# Patient Record
Sex: FEMALE | Race: WHITE | Employment: FULL TIME | URBAN - METROPOLITAN AREA
[De-identification: names, ages, dates, MRNs, and addresses within clinical notes are randomized per-mention and may not be internally consistent; named-entity substitution may affect disease eponyms.]

---

## 2023-02-01 ENCOUNTER — OFFICE VISIT (OUTPATIENT)
Dept: OBGYN CLINIC | Age: 33
End: 2023-02-01
Payer: COMMERCIAL

## 2023-02-01 VITALS
HEIGHT: 67 IN | SYSTOLIC BLOOD PRESSURE: 110 MMHG | DIASTOLIC BLOOD PRESSURE: 60 MMHG | BODY MASS INDEX: 21.03 KG/M2 | WEIGHT: 134 LBS

## 2023-02-01 DIAGNOSIS — N94.12 DEEP DYSPAREUNIA: ICD-10-CM

## 2023-02-01 DIAGNOSIS — Z86.69 HISTORY OF MIGRAINE WITH AURA: ICD-10-CM

## 2023-02-01 DIAGNOSIS — N92.3 INTERMENSTRUAL BLEEDING: ICD-10-CM

## 2023-02-01 DIAGNOSIS — Z11.51 SCREENING FOR HUMAN PAPILLOMAVIRUS (HPV): ICD-10-CM

## 2023-02-01 DIAGNOSIS — Z12.4 PAP SMEAR FOR CERVICAL CANCER SCREENING: ICD-10-CM

## 2023-02-01 DIAGNOSIS — Z01.419 WELL WOMAN EXAM WITH ROUTINE GYNECOLOGICAL EXAM: Primary | ICD-10-CM

## 2023-02-01 DIAGNOSIS — N94.6 SEVERE DYSMENORRHEA: ICD-10-CM

## 2023-02-01 DIAGNOSIS — Z01.419 WELL WOMAN EXAM WITH ROUTINE GYNECOLOGICAL EXAM: ICD-10-CM

## 2023-02-01 LAB
HCG, PREGNANCY, URINE, POC: NEGATIVE
VALID INTERNAL CONTROL, POC: YES

## 2023-02-01 PROCEDURE — 99203 OFFICE O/P NEW LOW 30 MIN: CPT | Performed by: OBSTETRICS & GYNECOLOGY

## 2023-02-01 PROCEDURE — 81025 URINE PREGNANCY TEST: CPT | Performed by: OBSTETRICS & GYNECOLOGY

## 2023-02-01 PROCEDURE — 99385 PREV VISIT NEW AGE 18-39: CPT | Performed by: OBSTETRICS & GYNECOLOGY

## 2023-02-01 RX ORDER — RIMEGEPANT SULFATE 75 MG/75MG
TABLET, ORALLY DISINTEGRATING ORAL
Qty: 30 TABLET | Refills: 6 | Status: SHIPPED | OUTPATIENT
Start: 2023-02-01

## 2023-02-01 RX ORDER — RIMEGEPANT SULFATE 75 MG/75MG
TABLET, ORALLY DISINTEGRATING ORAL
COMMUNITY
Start: 2022-12-21 | End: 2023-02-01 | Stop reason: SDUPTHER

## 2023-02-01 RX ORDER — IBUPROFEN 800 MG/1
TABLET ORAL
COMMUNITY
Start: 2023-01-16

## 2023-02-01 RX ORDER — SUMATRIPTAN 100 MG/1
100 TABLET, FILM COATED ORAL
Qty: 27 TABLET | Refills: 6 | Status: SHIPPED | OUTPATIENT
Start: 2023-02-01 | End: 2023-02-01

## 2023-02-01 NOTE — PROGRESS NOTES
CC:  Annual GYN exam    HPI:  28 y.o.  G0  presents today for a NEW PT routine gynecological examination. Patient's last menstrual period was 2023. Mary Ellen Hicks Also desires Pre-conception counseling as they just started trying for pregnancy very recently. Moved from [place] in September.        PCP: None here yet    Contraception:  none --- desires pregnancy   Has only been trying for 1 month (only condoms previously)   +PNV   UPT today neg       Hx Severe dysmenorrhea   Reports being curled up in a ball whenever has period   Never been on anything for hormonal control of her periods   some deep dyspareunia  No pain w/ defecating     Pt unaware of any family history of endometriosis      Menses:  Q 26+  Days x 5 days, moderate  Flow, reports intermenstrual VB/spotting routinely  for the past 1yr ,  no post coital VB       Prior imaging:  None      Sexually active w/    No changes in sexual partners --declines STD testing              Migraines w/ Aura:  Has never seen a neurologist --PCP was managing in [place]  Currently taking Sumatriptan and Nurtec (both PRN) -- both fine in pregnancy   Taking Magnesium as well         Hx seizures:  As a child only --- none since the age of 3yo   Never was on meds for this --reports was offered but her mother declined   Denies febrile seizures           PGM w/ breast CA --diagnosed at 64yo          GYN HISTORY:  As per HPI     Last Pap:  approx 3yrs ago in [place] -- wnl per patient  Hx of Abnl Paps: never     Hx STDs: none          OB History          0    Para   0    Term   0       0    AB   0    Living   0         SAB   0    IAB   0    Ectopic   0    Molar   0    Multiple   0    Live Births   0                  Past Medical History:   Diagnosis Date    Chicken pox     Deep dyspareunia     History of migraine with aura     History of seizures as a child     Kidney stones     Migraine     Seizure ([facility] [address]     Severe dysmenorrhea          No past surgical history on file.      Outpatient Encounter Medications as of 2/1/2023   Medication Sig Dispense Refill    ibuprofen (ADVIL;MOTRIN) 800 MG tablet TAKE 1 TABLET BY MOUTH THREE TIMES DAILY FOR 30 DAYS      SUMAtriptan Succinate (IMITREX PO) Take by mouth      NURTEC 75 MG TBDP DISSOLVE 1 TABLET BY MOUTH ONCE DAILY AS NEEDED 30 tablet 6    SUMAtriptan (IMITREX) 100 MG tablet Take 1 tablet by mouth once as needed for Migraine 27 tablet 6    [DISCONTINUED] NURTEC 75 MG TBDP DISSOLVE 1 TABLET BY MOUTH ONCE DAILY AS NEEDED       No facility-administered encounter medications on file as of 2/1/2023. No Known Allergies      Family History   Problem Relation Age of Onset    Breast Cancer Paternal Grandmother     Diabetes Maternal Grandmother     Hypertension Father          Social History     Socioeconomic History    Marital status:      Spouse name: None    Number of children: None    Years of education: None    Highest education level: None   Tobacco Use    Smoking status: Never    Smokeless tobacco: Never   Vaping Use    Vaping Use: Never used   Substance and Sexual Activity    Alcohol use: Not Currently    Drug use: Never    Sexual activity: Yes     Partners: Male           ROS:  Constitutional: Negative for chills, fever and weight loss. HENT: Negative for hearing loss. Eyes: Negative for blurred vision and double vision. Respiratory: Negative for cough, hemoptysis and shortness of breath. Cardiovascular: Negative for chest pain, palpitations and orthopnea. Gastrointestinal: Negative for abdominal pain, blood in stool, constipation, diarrhea, nausea and vomiting. Genitourinary: Negative for dysuria, frequency, hematuria and urgency. Musculoskeletal: Negative for falls, joint pain and myalgias. Skin: Negative for itching and rash. Neurological: Negative for headaches. Endo/Heme/Allergies: Does not bruise/bleed easily. Psychiatric/Behavioral: Negative for depression and suicidal ideas.  The patient is not nervous/anxious. All other systems reviewed and are negative. PHYSICAL EXAM:  Blood pressure 110/60, height 5' 7\" (1.702 m), weight 134 lb (60.8 kg), last menstrual period 01/09/2023. Constitutional: She appears well-developed and well-nourished. No distress. HENT:    Head: Normocephalic and atraumatic. Neck: Normal range of motion. Cardiovascular: Normal rate, regular rhythm and normal heart sounds. Exam reveals no gallop and no friction rub. No murmur heard. Pulmonary/Chest: Effort normal and breath sounds normal. No respiratory distress. She has no wheezes. She has no rales. Abdominal: Soft. Bowel sounds are normal. She exhibits no distension and no mass. There is no tenderness. There is no rebound and no guarding. Skin: She is not diaphoretic. Psychiatric: She has a normal mood and affect. Her behavior is normal. Thought content normal. .    Pelvic:   External genitalia wnl, no lesions, rashes  Clitoris and urethra midline  Vagina pink, moist, well rugated  Cervix without lesion/masses, DC wnl, no CMT   Uterus normal in size and contour, no masses, NTTP  Adnexa without masses, NTTP    Breasts:   Symmetric, no lesions, masses, rashes, no abnl nipple Dc       Counseling:  Discussed General Recommendations:  -Routine Pap (unless cervix removed for benign reasons)  -STD screening annually pts </= 24yo or high risk   -lipid profile every 5 yrs  -Tdap once and then Td every 10yrs  -Influenza Vaccine, annually  -Healthy eating/exercise   -HPV vaccine newest recs (10yo-44yo)       Stay on PNV     preconception lab options offered:  alpha thalassemia, beta hemoglobinopathies (beta thalassemia and sickle cell), cystic fibrosis, Duchenne/Voss muscular dystrophy, spinal muscular atrophy, Bloom syndrome, Canavan disease, familial disautonomia, Fanconi anemia, Gaucher disease, Mucolipidosis IV, Malgorzata-Pick disease, types a and B, and Deepak-Sachs disease.        Discussed with patient's complaints of significant dysmenorrhea and deep dyspareunia as she very well may have endometriosis. We also discussed her intermenstrual bleeding is not normal and should be further evaluated. .Endometriosis is a condition in which portions of the endometrium are found outside the uterus. It occurs in about 1/10 women of reproductive age. Many have no symptoms or only mild discomfort, whereas others have severe pain. Endometriosis also is a leading cause of infertility. For women with endometriosis, dysmenorrhea often becomes worse over time unless ovulation is suppressed. Pain also may occur during sex, BMs, or with urination if implants present on bowels/bladder. Heavy menstrual bleeding is another symptom of endometriosis. Hormones may help slow the growth of the endometrial tissue and may keep new adhesions from forming in addition to helping with HMB and pain. These drugs typically do not get rid of endometriosis tissue that is already there. Pain often returns after the medications are stopped. Most commonly used medications are NSAIDs (pain relief only), OCPs, Progestins (Depo Provera, progestin only pills, Mirena/Liletta IUD), GnRH agonists (Lupron), or GnRh antagonist Dior Lao). .     Surgery can be done to relieve pain and improve fertility by fulguration or excision of endometriosis. After surgery, most women have relief from pain but about 40-80% of women have pain again within 2 years of surgery. Continuing hormonal control of periods after having surgery may help extend the pain-free period. Hysterectomy is considered definitive management of endometriosis if done w/ childbearing and fails medical management. AUB :              Patient counseled face to face for more than 50% of the total time spent with the patient.   On this date I have spent an additional 28 minutes discussing the patient's aforementioned PMH, complaints, possible differential diagnoses,  and face to face with the patient discussing the diagnosis and importance of compliance with the treatment plan as well as documenting.   This was over and above that spent on a routine well woman exam.         ASSESSMENT/PLAN:   28 y.o.,  for annual GYN exam w/ severe dysmenorrhea, intermenstrual VB, deep dyspareunia, desire for pregnancy:     1) Annual:   -Cotesting today    -Declines STD testing    -stay on PNV    -safe sexual practices    -FU w/ PCP for all non-GYN medical issues and regular screening     -annual Flu vaccine recommended    -healthy eating, exercise        2) severe dysmenorrhea, intermenstrual VB, deep dyspareunia:   -upt today negative     -See counseling--strong possibility of endometriosis in addition to possible uterine polyp   -RTO for TVUS    -Treatment options obviously limited secondary to desire for pregnancy   -NSAIDs   -Counseled on possibility of effects on fertility if she were to indeed, to have endometriosis        3) migraines with aura:   -Refills of Imitrex and Nurtec given today until she can establish care with a PCP        4) desire for pregnancy:   -Stay on PNV   -Preconception labs discussed as above--pt instructed to talk with insurance about coverage if desires         Haley Givens MD

## 2023-02-03 PROBLEM — N94.6 SEVERE DYSMENORRHEA: Status: ACTIVE | Noted: 2023-02-03

## 2023-02-03 PROBLEM — Z86.69 HISTORY OF MIGRAINE WITH AURA: Status: ACTIVE | Noted: 2023-02-03

## 2023-02-03 PROBLEM — N94.12 DEEP DYSPAREUNIA: Status: ACTIVE | Noted: 2023-02-03

## 2023-02-03 PROBLEM — N92.3 INTERMENSTRUAL BLEEDING: Status: ACTIVE | Noted: 2023-02-03

## 2023-02-06 LAB
CYTOLOGIST CVX/VAG CYTO: NORMAL
CYTOLOGY CVX/VAG DOC THIN PREP: NORMAL
HPV APTIMA: NEGATIVE
HPV GENOTYPE REFLEX: NORMAL
Lab: NORMAL
PATH REPORT.FINAL DX SPEC: NORMAL
STAT OF ADQ CVX/VAG CYTO-IMP: NORMAL

## 2023-02-16 ENCOUNTER — TELEPHONE (OUTPATIENT)
Dept: OBGYN CLINIC | Age: 33
End: 2023-02-16

## 2023-02-16 NOTE — TELEPHONE ENCOUNTER
[facility]. Min sent a letter to inform us that the name listed does not match what is on file. She needs to call her insurance company to update inf. We also need her insurance info.

## 2023-03-09 ENCOUNTER — TELEPHONE (OUTPATIENT)
Dept: OBGYN CLINIC | Age: 33
End: 2023-03-09

## 2023-03-09 NOTE — TELEPHONE ENCOUNTER
Reached out to pt about PA and pt stated that her name is on the policy however the insurance card states her husbands name. She has not legally swithced her name to  name still. So name on phone is legal name. ID shows only husbands name.

## 2023-11-10 ENCOUNTER — TELEPHONE (OUTPATIENT)
Dept: OBGYN CLINIC | Age: 33
End: 2023-11-10

## 2023-11-10 NOTE — TELEPHONE ENCOUNTER
Rec below message via staff message    Patient came in as a New Pt on 02-01-23. Dr. Renee Lancaster charged for an A/E and also an Office Visit for Pre-Conception Counseling. Patient is disputing the charge for the OV since she was never informed this would be an extra charge. If you can please call the patient. I called patient today left  for call back.